# Patient Record
Sex: FEMALE | Race: WHITE | NOT HISPANIC OR LATINO | URBAN - NONMETROPOLITAN AREA
[De-identification: names, ages, dates, MRNs, and addresses within clinical notes are randomized per-mention and may not be internally consistent; named-entity substitution may affect disease eponyms.]

---

## 2023-03-14 DIAGNOSIS — F41.9 ANXIETY: Primary | ICD-10-CM

## 2023-03-14 RX ORDER — PAROXETINE HYDROCHLORIDE 20 MG/1
20 TABLET, FILM COATED ORAL
Qty: 30 TABLET | Refills: 0 | Status: SHIPPED | OUTPATIENT
Start: 2023-03-14 | End: 2023-03-28 | Stop reason: SDUPTHER

## 2023-03-14 RX ORDER — PAROXETINE HYDROCHLORIDE 20 MG/1
20 TABLET, FILM COATED ORAL
COMMUNITY
Start: 2021-12-09 | End: 2023-03-14 | Stop reason: SDUPTHER

## 2023-03-23 PROBLEM — E66.01 MORBID OBESITY WITH BODY MASS INDEX (BMI) OF 40.0 OR HIGHER (MULTI): Status: ACTIVE | Noted: 2023-03-23

## 2023-03-23 PROBLEM — F41.9 ANXIETY: Status: ACTIVE | Noted: 2023-03-23

## 2023-03-23 PROBLEM — B07.9 WART OF HAND: Status: ACTIVE | Noted: 2023-03-23

## 2023-03-23 PROBLEM — E28.2 PCOS (POLYCYSTIC OVARIAN SYNDROME): Status: ACTIVE | Noted: 2023-03-23

## 2023-03-23 PROBLEM — J45.20 MILD INTERMITTENT ASTHMA (HHS-HCC): Status: ACTIVE | Noted: 2023-03-23

## 2023-03-23 PROBLEM — N91.1 SECONDARY AMENORRHEA: Status: ACTIVE | Noted: 2023-03-23

## 2023-03-23 RX ORDER — ALBUTEROL SULFATE 90 UG/1
2 AEROSOL, METERED RESPIRATORY (INHALATION) EVERY 4 HOURS PRN
COMMUNITY
Start: 2021-12-09 | End: 2023-03-28 | Stop reason: SDUPTHER

## 2023-03-28 ENCOUNTER — OFFICE VISIT (OUTPATIENT)
Dept: PRIMARY CARE | Facility: CLINIC | Age: 28
End: 2023-03-28

## 2023-03-28 VITALS
BODY MASS INDEX: 47.12 KG/M2 | HEIGHT: 60 IN | OXYGEN SATURATION: 98 % | SYSTOLIC BLOOD PRESSURE: 124 MMHG | HEART RATE: 68 BPM | DIASTOLIC BLOOD PRESSURE: 72 MMHG | WEIGHT: 240 LBS

## 2023-03-28 DIAGNOSIS — B07.9 WART OF HAND: ICD-10-CM

## 2023-03-28 DIAGNOSIS — J45.20 MILD INTERMITTENT ASTHMA WITHOUT COMPLICATION (HHS-HCC): Primary | ICD-10-CM

## 2023-03-28 DIAGNOSIS — F41.9 ANXIETY: ICD-10-CM

## 2023-03-28 DIAGNOSIS — E66.01 MORBID OBESITY WITH BODY MASS INDEX (BMI) OF 40.0 OR HIGHER (MULTI): ICD-10-CM

## 2023-03-28 PROCEDURE — 99214 OFFICE O/P EST MOD 30 MIN: CPT | Performed by: INTERNAL MEDICINE

## 2023-03-28 PROCEDURE — 1036F TOBACCO NON-USER: CPT | Performed by: INTERNAL MEDICINE

## 2023-03-28 RX ORDER — PAROXETINE HYDROCHLORIDE 20 MG/1
20 TABLET, FILM COATED ORAL
Qty: 90 TABLET | Refills: 1 | Status: SHIPPED | OUTPATIENT
Start: 2023-03-28 | End: 2023-10-25 | Stop reason: SDUPTHER

## 2023-03-28 RX ORDER — ALBUTEROL SULFATE 90 UG/1
2 AEROSOL, METERED RESPIRATORY (INHALATION) EVERY 4 HOURS PRN
Qty: 18 G | Refills: 2 | Status: SHIPPED | OUTPATIENT
Start: 2023-03-28

## 2023-03-28 ASSESSMENT — ENCOUNTER SYMPTOMS
BLOOD IN STOOL: 0
ARTHRALGIAS: 0
CHEST TIGHTNESS: 0
NAUSEA: 0
WHEEZING: 0
DIARRHEA: 0
VOMITING: 0
SHORTNESS OF BREATH: 0
FATIGUE: 0
COUGH: 0
BACK PAIN: 0
ABDOMINAL PAIN: 0
PALPITATIONS: 0

## 2023-03-28 NOTE — ASSESSMENT & PLAN NOTE
-She will go for fasting lab work which includes blood sugar and thyroid blood test.  I have agreed to call her with results.  -We remind her eat a healthy diet along with exercise and weight loss

## 2023-03-28 NOTE — PATIENT INSTRUCTIONS
-I have ordered lab work and please go at your earliest convenience to get it done.  We would like for you to be fasting and we will call you with the results when it comes back  -Please call us if you are having any problems and otherwise we will see you back in 6 months

## 2023-03-28 NOTE — ASSESSMENT & PLAN NOTE
-She is doing well on paroxetine 20 mg daily  -We are providing a refill today and we will see her back in approximately 6 months

## 2023-03-28 NOTE — ASSESSMENT & PLAN NOTE
-Back in May 2022 we referred her to dermatology but she did not quite make it in.  She will let us know if she would like to have another referral

## 2023-03-28 NOTE — ASSESSMENT & PLAN NOTE
-Asthma has been under relatively good control  -She knows to call in if she gets frequent symptoms  -We are providing a refill on her albuterol

## 2023-03-28 NOTE — PROGRESS NOTES
Subjective   Patient ID: Ashlee Gary is a 28 y.o. female who presents for No chief complaint on file..  HPI  She is here today for follow-up and accompanied by her fiancé.  She reports that she tried the higher dose of the paroxetine but she did not feel quite right so she went back to the 20 mg daily.  She reports that this has been pretty good in controlling her anxiety.  We also conducted a review of systems and we talked about her asthma.  Its been under relatively good control but she does not have a rescue inhaler on hand and she states there are some days when the weather changes and she feels like she could use a little bit of her albuterol.  We also discussed screening lab work and we had decided at her last visit in May 2022 that we should check her sugar and probably a thyroid blood test.  She states that she is ready to get those labs done so I have reordered them and when they come back we will call her.  Otherwise we plan on seeing her back in approximately 6 months but sooner if any problems.  Review of Systems   Constitutional:  Negative for fatigue.   Respiratory:  Negative for cough, chest tightness, shortness of breath and wheezing.    Cardiovascular:  Negative for chest pain, palpitations and leg swelling.   Gastrointestinal:  Negative for abdominal pain, blood in stool, diarrhea, nausea and vomiting.   Musculoskeletal:  Negative for arthralgias and back pain.     Objective   Physical Exam  Vitals and nursing note reviewed.   Constitutional:       General: She is not in acute distress.     Appearance: Normal appearance.   HENT:      Head: Normocephalic and atraumatic.   Eyes:      Conjunctiva/sclera: Conjunctivae normal.   Cardiovascular:      Rate and Rhythm: Normal rate and regular rhythm.      Heart sounds: Normal heart sounds.   Pulmonary:      Effort: No respiratory distress.      Breath sounds: No wheezing.   Abdominal:      Palpations: Abdomen is soft.      Tenderness: There is no abdominal  tenderness. There is no guarding.   Musculoskeletal:         General: No swelling. Normal range of motion.   Skin:     General: Skin is warm and dry.   Neurological:      General: No focal deficit present.      Mental Status: She is alert and oriented to person, place, and time.   Psychiatric:         Behavior: Behavior normal.       Assessment/Plan   Problem List Items Addressed This Visit          Respiratory    Mild intermittent asthma - Primary     -Asthma has been under relatively good control  -She knows to call in if she gets frequent symptoms  -We are providing a refill on her albuterol         Relevant Medications    albuterol 90 mcg/actuation inhaler    Other Relevant Orders    Follow Up In Primary Care       Musculoskeletal    Wart of hand     -Back in May 2022 we referred her to dermatology but she did not quite make it in.  She will let us know if she would like to have another referral            Endocrine/Metabolic    Morbid obesity with body mass index (BMI) of 40.0 or higher (CMS/[facility])     -She will go for fasting lab work which includes blood sugar and thyroid blood test.  I have agreed to call her with results.  -We remind her eat a healthy diet along with exercise and weight loss         Relevant Orders    Basic Metabolic Panel    Hemoglobin A1C       Other    Anxiety     -She is doing well on paroxetine 20 mg daily  -We are providing a refill today and we will see her back in approximately 6 months         Relevant Medications    PARoxetine (Paxil) 20 mg tablet    Other Relevant Orders    TSH    Follow Up In Primary Care          Melani Ng DO

## 2023-09-26 ENCOUNTER — APPOINTMENT (OUTPATIENT)
Dept: PRIMARY CARE | Facility: CLINIC | Age: 28
End: 2023-09-26

## 2023-10-25 DIAGNOSIS — F41.9 ANXIETY: ICD-10-CM

## 2023-10-25 RX ORDER — PAROXETINE HYDROCHLORIDE 20 MG/1
20 TABLET, FILM COATED ORAL
Qty: 90 TABLET | Refills: 1 | Status: SHIPPED | OUTPATIENT
Start: 2023-10-25 | End: 2024-05-30 | Stop reason: SDUPTHER

## 2024-05-30 DIAGNOSIS — F41.9 ANXIETY: ICD-10-CM

## 2024-05-30 RX ORDER — PAROXETINE HYDROCHLORIDE 20 MG/1
20 TABLET, FILM COATED ORAL
Qty: 90 TABLET | Refills: 1 | Status: SHIPPED | OUTPATIENT
Start: 2024-05-30

## 2024-12-23 DIAGNOSIS — F41.9 ANXIETY: ICD-10-CM

## 2025-01-31 DIAGNOSIS — F41.9 ANXIETY: ICD-10-CM

## 2025-01-31 RX ORDER — PAROXETINE HYDROCHLORIDE 20 MG/1
20 TABLET, FILM COATED ORAL
Qty: 90 TABLET | Refills: 1 | OUTPATIENT
Start: 2025-01-31

## 2025-02-03 RX ORDER — PAROXETINE HYDROCHLORIDE 20 MG/1
20 TABLET, FILM COATED ORAL
Qty: 90 TABLET | Refills: 1 | OUTPATIENT
Start: 2025-02-03

## 2025-02-04 ENCOUNTER — OFFICE VISIT (OUTPATIENT)
Age: 30
End: 2025-02-04
Payer: MEDICAID

## 2025-02-04 VITALS
BODY MASS INDEX: 45.2 KG/M2 | HEART RATE: 94 BPM | OXYGEN SATURATION: 98 % | SYSTOLIC BLOOD PRESSURE: 126 MMHG | WEIGHT: 230.2 LBS | HEIGHT: 60 IN | DIASTOLIC BLOOD PRESSURE: 78 MMHG

## 2025-02-04 DIAGNOSIS — F41.8 ANXIETY WITH DEPRESSION: Primary | ICD-10-CM

## 2025-02-04 DIAGNOSIS — J45.20 MILD INTERMITTENT ASTHMA WITHOUT COMPLICATION (HHS-HCC): ICD-10-CM

## 2025-02-04 DIAGNOSIS — B07.9 WART OF HAND: ICD-10-CM

## 2025-02-04 PROBLEM — F41.9 ANXIETY: Status: RESOLVED | Noted: 2023-03-23 | Resolved: 2025-02-04

## 2025-02-04 PROCEDURE — 3008F BODY MASS INDEX DOCD: CPT | Performed by: INTERNAL MEDICINE

## 2025-02-04 PROCEDURE — 1036F TOBACCO NON-USER: CPT | Performed by: INTERNAL MEDICINE

## 2025-02-04 PROCEDURE — 99214 OFFICE O/P EST MOD 30 MIN: CPT | Performed by: INTERNAL MEDICINE

## 2025-02-04 RX ORDER — ESCITALOPRAM OXALATE 10 MG/1
10 TABLET ORAL DAILY
Qty: 30 TABLET | Refills: 2 | Status: SHIPPED | OUTPATIENT
Start: 2025-02-04 | End: 2025-08-03

## 2025-02-04 RX ORDER — ALBUTEROL SULFATE 90 UG/1
2 INHALANT RESPIRATORY (INHALATION) EVERY 4 HOURS PRN
Qty: 18 G | Refills: 3 | Status: SHIPPED | OUTPATIENT
Start: 2025-02-04

## 2025-02-04 NOTE — ASSESSMENT & PLAN NOTE
-We will place another referral to dermatology and she states she will be following up accordingly

## 2025-02-04 NOTE — ASSESSMENT & PLAN NOTE
-We will do a rapid wean of the Paxil and then transition to Lexapro  -She has had this in the past and her mother seems to do well with this medication  -We will see her back in 5 weeks and she will call sooner if things are not going well

## 2025-02-04 NOTE — PATIENT INSTRUCTIONS
Patient instructions  As we discussed we are weaning your Paxil and you will start on Lexapro.  Start by breaking your Paxil 20 mg tablet in half so that you have 10 mg tablets.  Please take 1 a day for the next 4 days.  If you do not feel well please call and otherwise you will stop the Paxil after 4 days and immediately start taking the Lexapro.  Again call if you have any problems and otherwise I want to see you back in approximately 5 weeks for another checkup  The staff will be calling you about making appointment with dermatology and please also remember to make an appointment to see your gynecologist

## 2025-02-04 NOTE — PROGRESS NOTES
Subjective   Patient ID: Ashlee Gary is a 29 y.o. female who presents for Med Refill (MEDICATION CHECK ).  Med Refill    She is here today for a checkup and is accompanied by her fiancé.  She is here to get a checkup for her anxiety and refills on her Paxil.  She states she feels like the Paxil has been helpful but she still has some anxiety which keeps her from doing things as far as getting out and going to doctors appointments.  She also states sometimes she feels a little bit down.  She states she thinks that her mother has been diagnosed with bipolar and has done well with Lexapro.  She states she was on Lexapro 1 time and thought it worked electively well.  We have decided to wean the Paxil rapidly and get her on Lexapro with a careful follow-up.  She also suffers from asthma but her symptoms have been very mild.  We are giving her a refill on her albuterol.  We also discussed the warts on her hands which seem to be spreading so she has agreed to go this time to her dermatology appointment with referral.  We also remind her to get in for regular Paps.  We will see her back in approximately 5 weeks and she knows she can call if things or not going well.  Objective   Physical Exam  Vitals and nursing note reviewed.   Constitutional:       General: She is not in acute distress.     Appearance: Normal appearance.   HENT:      Head: Normocephalic and atraumatic.   Eyes:      Conjunctiva/sclera: Conjunctivae normal.   Cardiovascular:      Rate and Rhythm: Normal rate and regular rhythm.      Heart sounds: Normal heart sounds.   Pulmonary:      Effort: No respiratory distress.      Breath sounds: No wheezing.   Abdominal:      Palpations: Abdomen is soft.      Tenderness: There is no abdominal tenderness. There is no guarding.   Musculoskeletal:         General: No swelling. Normal range of motion.   Skin:     General: Skin is warm and dry.   Neurological:      General: No focal deficit present.      Mental Status: She  is alert and oriented to person, place, and time.   Psychiatric:         Behavior: Behavior normal.       Assessment/Plan   Problem List Items Addressed This Visit             ICD-10-CM    Mild intermittent asthma J45.20     -Her asthma has been stable and we are providing a refill on her albuterol         Relevant Medications    albuterol 90 mcg/actuation inhaler    Wart of hand B07.9     -We will place another referral to dermatology and she states she will be following up accordingly         Relevant Orders    Referral to Dermatology    Anxiety with depression - Primary F41.8     -We will do a rapid wean of the Paxil and then transition to Lexapro  -She has had this in the past and her mother seems to do well with this medication  -We will see her back in 5 weeks and she will call sooner if things are not going well         Relevant Medications    escitalopram (Lexapro) 10 mg tablet   Patient instructions  As we discussed we are weaning your Paxil and you will start on Lexapro.  Start by breaking your Paxil 20 mg tablet in half so that you have 10 mg tablets.  Please take 1 a day for the next 4 days.  If you do not feel well please call and otherwise you will stop the Paxil after 4 days and immediately start taking the Lexapro.  Again call if you have any problems and otherwise I want to see you back in approximately 5 weeks for another checkup  The staff will be calling you about making appointment with dermatology and please also remember to make an appointment to see your gynecologist       Melani Ng, DO

## 2025-03-11 ENCOUNTER — APPOINTMENT (OUTPATIENT)
Age: 30
End: 2025-03-11
Payer: MEDICAID

## 2025-03-18 ENCOUNTER — APPOINTMENT (OUTPATIENT)
Age: 30
End: 2025-03-18
Payer: MEDICAID

## 2025-03-18 VITALS
DIASTOLIC BLOOD PRESSURE: 82 MMHG | HEART RATE: 65 BPM | HEIGHT: 60 IN | SYSTOLIC BLOOD PRESSURE: 126 MMHG | BODY MASS INDEX: 45.1 KG/M2 | WEIGHT: 229.7 LBS | OXYGEN SATURATION: 97 %

## 2025-03-18 DIAGNOSIS — F41.8 ANXIETY WITH DEPRESSION: ICD-10-CM

## 2025-03-18 DIAGNOSIS — Z13.1 ENCOUNTER FOR SCREENING EXAMINATION FOR IMPAIRED GLUCOSE REGULATION AND DIABETES MELLITUS: ICD-10-CM

## 2025-03-18 DIAGNOSIS — R53.83 OTHER FATIGUE: ICD-10-CM

## 2025-03-18 DIAGNOSIS — L68.0 HIRSUTISM: ICD-10-CM

## 2025-03-18 DIAGNOSIS — Z00.00 HEALTHCARE MAINTENANCE: Primary | ICD-10-CM

## 2025-03-18 PROCEDURE — 99214 OFFICE O/P EST MOD 30 MIN: CPT | Performed by: INTERNAL MEDICINE

## 2025-03-18 PROCEDURE — 3008F BODY MASS INDEX DOCD: CPT | Performed by: INTERNAL MEDICINE

## 2025-03-18 RX ORDER — ESCITALOPRAM OXALATE 10 MG/1
10 TABLET ORAL DAILY
Qty: 100 TABLET | Refills: 1 | Status: SHIPPED | OUTPATIENT
Start: 2025-03-18 | End: 2025-09-14

## 2025-03-18 NOTE — ASSESSMENT & PLAN NOTE
-Doing very well on the Lexapro and we will continue with her current dose  -We will also see her back in 3 months for reevaluation

## 2025-03-18 NOTE — PROGRESS NOTES
Subjective   Patient ID: Ashlee Gary is a 29 y.o. female who presents for Follow-up (5 WK CK).  HPI  She is here today for follow-up and accompanied by her fiancé.  Since her last visit she was able to convert from the Paxil to the Lexapro and she states it is definitely had a positive impact on her mood.  So far she is doing well on the 10 mg.  We discussed the fact that symptoms could improve again over the next 4 weeks and we also discussed possibly needing to go to the 20 mg dose.  We will continue to monitor.  We also discussed the fact that we estimate it has been almost a decade since she has had a Pap exam and she is agreeable to getting that scheduled.  I am going to refer her to gynecology.  She also states she will be making an appointment to see the dermatologist.  She also is concerned as she is had some whiskers underneath her chin that are very bothersome and new.  She is not aware of other women in her family having hirsutism.  Of course she has been diagnosed with PCOS.  I will do some preliminary lab and she is also wanting a thyroid blood test because she has a strong family history of thyroid disease.  I will also be checking a fasting glucose and hemoglobin A1c.  I have agreed to contact her with results.  If everything goes according to plan we will see her back in 3 months for another checkup.  Objective   Physical Exam  Vitals and nursing note reviewed.   Constitutional:       General: She is not in acute distress.     Appearance: Normal appearance.   HENT:      Head: Normocephalic and atraumatic.   Eyes:      Conjunctiva/sclera: Conjunctivae normal.   Cardiovascular:      Rate and Rhythm: Normal rate and regular rhythm.      Heart sounds: Normal heart sounds.   Pulmonary:      Effort: No respiratory distress.      Breath sounds: No wheezing.   Abdominal:      Palpations: Abdomen is soft.      Tenderness: There is no abdominal tenderness. There is no guarding.   Musculoskeletal:          General: No swelling. Normal range of motion.   Skin:     General: Skin is warm and dry.   Neurological:      General: No focal deficit present.      Mental Status: She is alert and oriented to person, place, and time.   Psychiatric:         Behavior: Behavior normal.       Assessment/Plan   Problem List Items Addressed This Visit             ICD-10-CM    Anxiety with depression F41.8     -Doing very well on the Lexapro and we will continue with her current dose  -We will also see her back in 3 months for reevaluation         Relevant Medications    escitalopram (Lexapro) 10 mg tablet    Encounter for screening examination for impaired glucose regulation and diabetes mellitus - Primary Z13.1     -She will go for fasting blood work and have agreed to contact her with results         Relevant Orders    Basic Metabolic Panel    Hemoglobin A1C    Hirsutism L68.0     -I will be giving some endocrine labs and have agreed to contact her with results         Relevant Orders    17-Hydroxyprogesterone    Comprehensive metabolic panel    Insulin, fasting    Prolactin    Testosterone, free, total    TSH    Other fatigue R53.83     -We will check a thyroid blood test with reflex to T4 because she has a strong family history of thyroid disease i.e. her mother  -I have agreed to contact her with results         Relevant Orders    Tsh With Reflex To Free T4 If Abnormal   Patient instructions  As we discussed I have ordered several labs and please go to the lab at your earliest convenience when you have had an opportunity to fast for at least 8 to 12 hours.  When the results come back I will contact you  The staff will also be helping to arrange for you to see gynecology to get a thorough checkup  Please remember to call the dermatologist at your earliest convenience  Otherwise we will see you back in 3 months and please call if things are not going according to jennifer Ng, DO

## 2025-03-18 NOTE — ASSESSMENT & PLAN NOTE
-We will check a thyroid blood test with reflex to T4 because she has a strong family history of thyroid disease i.e. her mother  -I have agreed to contact her with results

## 2025-03-18 NOTE — PATIENT INSTRUCTIONS
Patient instructions  As we discussed I have ordered several labs and please go to the lab at your earliest convenience when you have had an opportunity to fast for at least 8 to 12 hours.  When the results come back I will contact you  The staff will also be helping to arrange for you to see gynecology to get a thorough checkup  Please remember to call the dermatologist at your earliest convenience  Otherwise we will see you back in 3 months and please call if things are not going according to plan

## 2025-04-02 ENCOUNTER — APPOINTMENT (OUTPATIENT)
Facility: CLINIC | Age: 30
End: 2025-04-02
Payer: MEDICAID

## 2025-04-02 VITALS
WEIGHT: 230.6 LBS | BODY MASS INDEX: 45.27 KG/M2 | HEIGHT: 60 IN | SYSTOLIC BLOOD PRESSURE: 122 MMHG | DIASTOLIC BLOOD PRESSURE: 80 MMHG

## 2025-04-02 DIAGNOSIS — Z01.419 WELL WOMAN EXAM: Primary | ICD-10-CM

## 2025-04-02 DIAGNOSIS — Z12.4 SCREENING FOR CERVICAL CANCER: ICD-10-CM

## 2025-04-02 DIAGNOSIS — E28.2 PCOS (POLYCYSTIC OVARIAN SYNDROME): ICD-10-CM

## 2025-04-02 DIAGNOSIS — L68.0 HIRSUTISM: ICD-10-CM

## 2025-04-02 DIAGNOSIS — Z00.00 HEALTHCARE MAINTENANCE: ICD-10-CM

## 2025-04-02 DIAGNOSIS — Z11.3 SCREENING EXAMINATION FOR STI: ICD-10-CM

## 2025-04-02 PROCEDURE — 88175 CYTOPATH C/V AUTO FLUID REDO: CPT

## 2025-04-02 PROCEDURE — 3008F BODY MASS INDEX DOCD: CPT | Performed by: STUDENT IN AN ORGANIZED HEALTH CARE EDUCATION/TRAINING PROGRAM

## 2025-04-02 PROCEDURE — 87491 CHLMYD TRACH DNA AMP PROBE: CPT

## 2025-04-02 PROCEDURE — 99395 PREV VISIT EST AGE 18-39: CPT | Performed by: STUDENT IN AN ORGANIZED HEALTH CARE EDUCATION/TRAINING PROGRAM

## 2025-04-02 PROCEDURE — 1036F TOBACCO NON-USER: CPT | Performed by: STUDENT IN AN ORGANIZED HEALTH CARE EDUCATION/TRAINING PROGRAM

## 2025-04-02 PROCEDURE — 87661 TRICHOMONAS VAGINALIS AMPLIF: CPT

## 2025-04-02 PROCEDURE — 87591 N.GONORRHOEAE DNA AMP PROB: CPT

## 2025-04-02 PROCEDURE — 87624 HPV HI-RISK TYP POOLED RSLT: CPT

## 2025-04-02 RX ORDER — SPIRONOLACTONE 50 MG/1
50 TABLET, FILM COATED ORAL DAILY
Qty: 30 TABLET | Refills: 1 | Status: SHIPPED | OUTPATIENT
Start: 2025-04-02 | End: 2026-04-02

## 2025-04-02 NOTE — PATIENT INSTRUCTIONS
Polycystic Ovarian Syndrome (PCOS):  5-20% of people with ovaries have this  Need 2 out of 3 to be diagnosed:  1. Irregular Periods  2. High Androgens (extra hair growth, acne, high testosterone)  3. Cysts on ovaries (found on ultrasound)    We do not fully understand why this develops in people. It does cause hyperandrogenism (high androgens) and insulin resistance.    Management through lifestyle changes & medication:  1. Balance your blood sugar by pairing them with protein, fat and fiber at meals and snack time, this helps to prevent blood sugar spikes and crashes  2. Birth control to help the uterus from lack of ovulation (when you don't ovulate your uterine lining can get thick and increase your risk of endometrial cancer, up to 5x)  3. Metformin can help with insulin resistance, this can potentially help with fertility and weight loss  4. Inositol supplements can be helpful as an alternative, you want a 40:1 ratio of myoinositol to d-chiro-inositol    PCOS is the #1 cause of infertility but it does not mean that you are infertile. If you are having trouble with getting pregnant please see your women's health provider (OB/GYN or Family medicine doctor). Weight loss is going to be the best thing to help increase your odds of regularly ovulating and getting pregnant. There are other options though.

## 2025-04-03 LAB
17OHP SERPL-MCNC: NORMAL NG/DL
ALBUMIN SERPL-MCNC: 4.6 G/DL (ref 3.6–5.1)
ALP SERPL-CCNC: 50 U/L (ref 31–125)
ALT SERPL-CCNC: 27 U/L (ref 6–29)
ANION GAP SERPL CALCULATED.4IONS-SCNC: 11 MMOL/L (CALC) (ref 7–17)
AST SERPL-CCNC: 21 U/L (ref 10–30)
BILIRUB SERPL-MCNC: 1.6 MG/DL (ref 0.2–1.2)
BUN SERPL-MCNC: 9 MG/DL (ref 7–25)
CALCIUM SERPL-MCNC: 9.6 MG/DL (ref 8.6–10.2)
CHLORIDE SERPL-SCNC: 102 MMOL/L (ref 98–110)
CO2 SERPL-SCNC: 26 MMOL/L (ref 20–32)
CREAT SERPL-MCNC: 0.58 MG/DL (ref 0.5–0.97)
EGFRCR SERPLBLD CKD-EPI 2021: 124 ML/MIN/1.73M2
EST. AVERAGE GLUCOSE BLD GHB EST-MCNC: 105 MG/DL
EST. AVERAGE GLUCOSE BLD GHB EST-SCNC: 5.8 MMOL/L
GLUCOSE SERPL-MCNC: 79 MG/DL (ref 65–99)
HBA1C MFR BLD: 5.3 % OF TOTAL HGB
POTASSIUM SERPL-SCNC: 4.3 MMOL/L (ref 3.5–5.3)
PROLACTIN SERPL-MCNC: 9 NG/ML
PROT SERPL-MCNC: 7.6 G/DL (ref 6.1–8.1)
SODIUM SERPL-SCNC: 139 MMOL/L (ref 135–146)
TESTOST FREE SERPL-MCNC: NORMAL PG/ML
TESTOST SERPL-MCNC: NORMAL NG/DL
TSH SERPL-ACNC: 2.44 MIU/L

## 2025-04-05 LAB
C TRACH RRNA SPEC QL NAA+PROBE: NEGATIVE
N GONORRHOEA DNA SPEC QL PROBE+SIG AMP: NEGATIVE
T VAGINALIS RRNA SPEC QL NAA+PROBE: NEGATIVE

## 2025-04-07 LAB
17OHP SERPL-MCNC: NORMAL NG/DL
ALBUMIN SERPL-MCNC: 4.6 G/DL (ref 3.6–5.1)
ALP SERPL-CCNC: 50 U/L (ref 31–125)
ALT SERPL-CCNC: 27 U/L (ref 6–29)
ANION GAP SERPL CALCULATED.4IONS-SCNC: 11 MMOL/L (CALC) (ref 7–17)
AST SERPL-CCNC: 21 U/L (ref 10–30)
BILIRUB SERPL-MCNC: 1.6 MG/DL (ref 0.2–1.2)
BUN SERPL-MCNC: 9 MG/DL (ref 7–25)
CALCIUM SERPL-MCNC: 9.6 MG/DL (ref 8.6–10.2)
CHLORIDE SERPL-SCNC: 102 MMOL/L (ref 98–110)
CO2 SERPL-SCNC: 26 MMOL/L (ref 20–32)
CREAT SERPL-MCNC: 0.58 MG/DL (ref 0.5–0.97)
EGFRCR SERPLBLD CKD-EPI 2021: 124 ML/MIN/1.73M2
GLUCOSE SERPL-MCNC: 79 MG/DL (ref 65–99)
POTASSIUM SERPL-SCNC: 4.3 MMOL/L (ref 3.5–5.3)
PROLACTIN SERPL-MCNC: 9 NG/ML
PROT SERPL-MCNC: 7.6 G/DL (ref 6.1–8.1)
SODIUM SERPL-SCNC: 139 MMOL/L (ref 135–146)
TESTOST FREE SERPL-MCNC: 4.9 PG/ML (ref 0.1–6.4)
TESTOST SERPL-MCNC: 32 NG/DL (ref 2–45)

## 2025-04-16 ENCOUNTER — TELEPHONE (OUTPATIENT)
Facility: CLINIC | Age: 30
End: 2025-04-16
Payer: MEDICAID

## 2025-04-16 LAB
CYTOLOGY CMNT CVX/VAG CYTO-IMP: NORMAL
HPV HR 12 DNA GENITAL QL NAA+PROBE: NEGATIVE
HPV HR GENOTYPES PNL CVX NAA+PROBE: NEGATIVE
HPV16 DNA SPEC QL NAA+PROBE: NEGATIVE
HPV18 DNA SPEC QL NAA+PROBE: NEGATIVE
LAB AP HPV GENOTYPE QUESTION: YES
LAB AP HPV HR: NORMAL
LAB AP PAP ADDITIONAL TESTS: NORMAL
LABORATORY COMMENT REPORT: NORMAL
LMP START DATE: NORMAL
PATH REPORT.TOTAL CANCER: NORMAL

## 2025-04-16 NOTE — TELEPHONE ENCOUNTER
Roya:  Please call patient to discuss the following pap smear normal. Should be repeated in 5 years..    Once finished please close the encounter.    Thank you,  Ebony Rockwell MD

## 2025-04-18 LAB
17OHP SERPL-MCNC: 25 NG/DL
ALBUMIN SERPL-MCNC: 4.6 G/DL (ref 3.6–5.1)
ALP SERPL-CCNC: 50 U/L (ref 31–125)
ALT SERPL-CCNC: 27 U/L (ref 6–29)
ANION GAP SERPL CALCULATED.4IONS-SCNC: 11 MMOL/L (CALC) (ref 7–17)
AST SERPL-CCNC: 21 U/L (ref 10–30)
BILIRUB SERPL-MCNC: 1.6 MG/DL (ref 0.2–1.2)
BUN SERPL-MCNC: 9 MG/DL (ref 7–25)
CALCIUM SERPL-MCNC: 9.6 MG/DL (ref 8.6–10.2)
CHLORIDE SERPL-SCNC: 102 MMOL/L (ref 98–110)
CO2 SERPL-SCNC: 26 MMOL/L (ref 20–32)
CREAT SERPL-MCNC: 0.58 MG/DL (ref 0.5–0.97)
EGFRCR SERPLBLD CKD-EPI 2021: 124 ML/MIN/1.73M2
GLUCOSE SERPL-MCNC: 79 MG/DL (ref 65–99)
POTASSIUM SERPL-SCNC: 4.3 MMOL/L (ref 3.5–5.3)
PROLACTIN SERPL-MCNC: 9 NG/ML
PROT SERPL-MCNC: 7.6 G/DL (ref 6.1–8.1)
SODIUM SERPL-SCNC: 139 MMOL/L (ref 135–146)
TESTOST FREE SERPL-MCNC: 4.9 PG/ML (ref 0.1–6.4)
TESTOST SERPL-MCNC: 32 NG/DL (ref 2–45)

## 2025-05-07 ENCOUNTER — APPOINTMENT (OUTPATIENT)
Facility: CLINIC | Age: 30
End: 2025-05-07
Payer: MEDICAID

## 2025-05-28 ENCOUNTER — APPOINTMENT (OUTPATIENT)
Facility: CLINIC | Age: 30
End: 2025-05-28
Payer: MEDICAID

## 2025-06-14 DIAGNOSIS — L68.0 HIRSUTISM: ICD-10-CM

## 2025-06-14 DIAGNOSIS — E28.2 PCOS (POLYCYSTIC OVARIAN SYNDROME): ICD-10-CM

## 2025-06-16 RX ORDER — SPIRONOLACTONE 50 MG/1
50 TABLET, FILM COATED ORAL DAILY
Qty: 30 TABLET | Refills: 1 | Status: SHIPPED | OUTPATIENT
Start: 2025-06-16 | End: 2025-06-19 | Stop reason: SDUPTHER

## 2025-06-19 ENCOUNTER — APPOINTMENT (OUTPATIENT)
Age: 30
End: 2025-06-19
Payer: MEDICAID

## 2025-06-19 VITALS
SYSTOLIC BLOOD PRESSURE: 126 MMHG | BODY MASS INDEX: 45.98 KG/M2 | HEIGHT: 60 IN | HEART RATE: 82 BPM | DIASTOLIC BLOOD PRESSURE: 76 MMHG | OXYGEN SATURATION: 97 % | WEIGHT: 234.2 LBS

## 2025-06-19 DIAGNOSIS — L68.0 HIRSUTISM: ICD-10-CM

## 2025-06-19 DIAGNOSIS — E28.2 PCOS (POLYCYSTIC OVARIAN SYNDROME): ICD-10-CM

## 2025-06-19 DIAGNOSIS — F41.8 ANXIETY WITH DEPRESSION: Primary | ICD-10-CM

## 2025-06-19 PROBLEM — R53.83 OTHER FATIGUE: Status: RESOLVED | Noted: 2025-03-18 | Resolved: 2025-06-19

## 2025-06-19 PROCEDURE — 3008F BODY MASS INDEX DOCD: CPT | Performed by: INTERNAL MEDICINE

## 2025-06-19 PROCEDURE — 1036F TOBACCO NON-USER: CPT | Performed by: INTERNAL MEDICINE

## 2025-06-19 PROCEDURE — 99213 OFFICE O/P EST LOW 20 MIN: CPT | Performed by: INTERNAL MEDICINE

## 2025-06-19 RX ORDER — ESCITALOPRAM OXALATE 20 MG/1
20 TABLET ORAL DAILY
Qty: 30 TABLET | Refills: 5 | Status: SHIPPED | OUTPATIENT
Start: 2025-06-19 | End: 2025-12-16

## 2025-06-19 RX ORDER — SPIRONOLACTONE 50 MG/1
50 TABLET, FILM COATED ORAL DAILY
Qty: 30 TABLET | Refills: 0 | Status: SHIPPED | OUTPATIENT
Start: 2025-06-19 | End: 2026-06-19

## 2025-06-19 ASSESSMENT — ENCOUNTER SYMPTOMS
ARTHRALGIAS: 0
FATIGUE: 0
SHORTNESS OF BREATH: 0
DIARRHEA: 0
NAUSEA: 0
WHEEZING: 0
PALPITATIONS: 0
ABDOMINAL PAIN: 0
VOMITING: 0
BACK PAIN: 0
BLOOD IN STOOL: 0
COUGH: 0

## 2025-06-19 ASSESSMENT — PATIENT HEALTH QUESTIONNAIRE - PHQ9
SUM OF ALL RESPONSES TO PHQ9 QUESTIONS 1 AND 2: 0
1. LITTLE INTEREST OR PLEASURE IN DOING THINGS: NOT AT ALL
2. FEELING DOWN, DEPRESSED OR HOPELESS: NOT AT ALL

## 2025-06-19 NOTE — ASSESSMENT & PLAN NOTE
- Still struggling somewhat with the hirsutism but she has a follow-up very soon with gynecology and will discuss possible change in medication dose

## 2025-06-19 NOTE — ASSESSMENT & PLAN NOTE
- Doing well but we decided to increase the dose to 20 mg.  She will call if she is not doing well

## 2025-06-19 NOTE — PATIENT INSTRUCTIONS
As we discussed I sent a 6-month supply of your Lexapro 20 mg to your pharmacy.  Please feel free to call at any time if you are not doing well.  I am glad that you will be following up with Dr. Rockwell in the near future and I did agree to give you a temporary refill on the spironolactone until you are seen by her.  Please call with any questions or concerns

## 2025-06-19 NOTE — PROGRESS NOTES
Subjective   Patient ID: Ashlee Gary is a 30 y.o. female who presents for Follow-up (3 MO CK).  HPI  She is here today for her 3-month checkup.  She is looking well and also reports feeling well.  We did conduct a full review of systems.  Since her last visit she was seen by Dr. Rockwell and had a thorough exam with lab work as well as a Pap.  Everything came back looking great.  We also reviewed the lab that I had ordered last time.  She is taking spironolactone for some hirsutism and she will be following up very soon with Dr. Rockwell shortly to discuss her progress with medication and possible consideration of medication dose changes.  She is out of the medicine so I have agreed to give her a temporary refill until she is seen in follow-up.  We also discussed her anxiety and she has had a very good response to the medicine but after discussion we felt that she would benefit from tweaking the dose and I am going to increase it to 20 mg daily.  She will call if she is not doing well.  We would see her back in 6 months for reevaluation and sooner if any problems  Review of Systems   Constitutional:  Negative for fatigue.   Respiratory:  Negative for cough, shortness of breath and wheezing.    Cardiovascular:  Negative for chest pain, palpitations and leg swelling.   Gastrointestinal:  Negative for abdominal pain, blood in stool, diarrhea, nausea and vomiting.   Musculoskeletal:  Negative for arthralgias and back pain.     Objective   Physical Exam  Vitals and nursing note reviewed.   Constitutional:       General: She is not in acute distress.     Appearance: Normal appearance.   HENT:      Head: Normocephalic and atraumatic.   Eyes:      Conjunctiva/sclera: Conjunctivae normal.   Cardiovascular:      Rate and Rhythm: Normal rate and regular rhythm.      Heart sounds: Normal heart sounds.   Pulmonary:      Effort: No respiratory distress.      Breath sounds: No wheezing.   Abdominal:      Palpations: Abdomen is soft.       Tenderness: There is no abdominal tenderness. There is no guarding.   Musculoskeletal:         General: No swelling. Normal range of motion.   Skin:     General: Skin is warm and dry.   Neurological:      General: No focal deficit present.      Mental Status: She is alert and oriented to person, place, and time.   Psychiatric:         Behavior: Behavior normal.       Assessment/Plan   Problem List Items Addressed This Visit           ICD-10-CM    PCOS (polycystic ovarian syndrome) E28.2    - She will follow-up with gynecology in the very near future         Relevant Medications    spironolactone (Aldactone) 50 mg tablet    Anxiety with depression - Primary F41.8    - Doing well but we decided to increase the dose to 20 mg.  She will call if she is not doing well         Relevant Medications    escitalopram (Lexapro) 20 mg tablet    Hirsutism L68.0    - Still struggling somewhat with the hirsutism but she has a follow-up very soon with gynecology and will discuss possible change in medication dose         Relevant Medications    spironolactone (Aldactone) 50 mg tablet   As we discussed I sent a 6-month supply of your Lexapro 20 mg to your pharmacy.  Please feel free to call at any time if you are not doing well.  I am glad that you will be following up with Dr. Rockwell in the near future and I did agree to give you a temporary refill on the spironolactone until you are seen by her.  Please call with any questions or concerns       Melani Ng, DO

## 2025-06-25 ENCOUNTER — APPOINTMENT (OUTPATIENT)
Facility: CLINIC | Age: 30
End: 2025-06-25
Payer: MEDICAID

## 2025-07-30 ENCOUNTER — APPOINTMENT (OUTPATIENT)
Facility: CLINIC | Age: 30
End: 2025-07-30
Payer: MEDICAID

## 2025-12-11 ENCOUNTER — APPOINTMENT (OUTPATIENT)
Age: 30
End: 2025-12-11
Payer: MEDICAID